# Patient Record
Sex: MALE | Race: WHITE | Employment: OTHER | URBAN - NONMETROPOLITAN AREA
[De-identification: names, ages, dates, MRNs, and addresses within clinical notes are randomized per-mention and may not be internally consistent; named-entity substitution may affect disease eponyms.]

---

## 2024-11-18 ENCOUNTER — LAB (OUTPATIENT)
Dept: LAB | Age: 44
End: 2024-11-18

## 2024-11-18 ENCOUNTER — OFFICE VISIT (OUTPATIENT)
Dept: RHEUMATOLOGY | Age: 44
End: 2024-11-18
Payer: COMMERCIAL

## 2024-11-18 VITALS
HEIGHT: 69 IN | DIASTOLIC BLOOD PRESSURE: 90 MMHG | WEIGHT: 158 LBS | HEART RATE: 109 BPM | BODY MASS INDEX: 23.4 KG/M2 | SYSTOLIC BLOOD PRESSURE: 138 MMHG | OXYGEN SATURATION: 96 %

## 2024-11-18 DIAGNOSIS — M05.9 SEROPOSITIVE RHEUMATOID ARTHRITIS (HCC): Primary | ICD-10-CM

## 2024-11-18 DIAGNOSIS — M05.9 SEROPOSITIVE RHEUMATOID ARTHRITIS (HCC): ICD-10-CM

## 2024-11-18 DIAGNOSIS — Z79.899 ENCOUNTER FOR LONG-TERM (CURRENT) USE OF HIGH-RISK MEDICATION: ICD-10-CM

## 2024-11-18 LAB
ALBUMIN SERPL BCG-MCNC: 3.8 G/DL (ref 3.5–5.1)
ALP SERPL-CCNC: 101 U/L (ref 38–126)
ALT SERPL W/O P-5'-P-CCNC: 16 U/L (ref 11–66)
ANION GAP SERPL CALC-SCNC: 13 MEQ/L (ref 8–16)
AST SERPL-CCNC: 22 U/L (ref 5–40)
BILIRUB SERPL-MCNC: 0.3 MG/DL (ref 0.3–1.2)
BUN SERPL-MCNC: 8 MG/DL (ref 7–22)
CALCIUM SERPL-MCNC: 9.2 MG/DL (ref 8.5–10.5)
CHLORIDE SERPL-SCNC: 98 MEQ/L (ref 98–111)
CO2 SERPL-SCNC: 26 MEQ/L (ref 23–33)
CREAT SERPL-MCNC: 0.6 MG/DL (ref 0.4–1.2)
GFR SERPL CREATININE-BSD FRML MDRD: > 90 ML/MIN/1.73M2
GLUCOSE SERPL-MCNC: 100 MG/DL (ref 70–108)
POTASSIUM SERPL-SCNC: 4.2 MEQ/L (ref 3.5–5.2)
PROT SERPL-MCNC: 8.3 G/DL (ref 6.1–8)
SODIUM SERPL-SCNC: 137 MEQ/L (ref 135–145)
URATE SERPL-MCNC: 4.7 MG/DL (ref 3.7–7)

## 2024-11-18 PROCEDURE — G8420 CALC BMI NORM PARAMETERS: HCPCS | Performed by: INTERNAL MEDICINE

## 2024-11-18 PROCEDURE — G8484 FLU IMMUNIZE NO ADMIN: HCPCS | Performed by: INTERNAL MEDICINE

## 2024-11-18 PROCEDURE — G8427 DOCREV CUR MEDS BY ELIG CLIN: HCPCS | Performed by: INTERNAL MEDICINE

## 2024-11-18 PROCEDURE — 99204 OFFICE O/P NEW MOD 45 MIN: CPT | Performed by: INTERNAL MEDICINE

## 2024-11-18 PROCEDURE — 1036F TOBACCO NON-USER: CPT | Performed by: INTERNAL MEDICINE

## 2024-11-18 RX ORDER — BUPRENORPHINE AND NALOXONE 8; 2 MG/1; MG/1
FILM, SOLUBLE BUCCAL; SUBLINGUAL
COMMUNITY
Start: 2024-10-24

## 2024-11-18 RX ORDER — ALBUTEROL SULFATE 90 UG/1
2 INHALANT RESPIRATORY (INHALATION) EVERY 4 HOURS PRN
COMMUNITY
Start: 2024-10-25

## 2024-11-18 RX ORDER — PREDNISONE 10 MG/1
TABLET ORAL
Qty: 30 TABLET | Refills: 0 | Status: SHIPPED | OUTPATIENT
Start: 2024-11-18

## 2024-11-18 ASSESSMENT — ENCOUNTER SYMPTOMS
COUGH: 0
NAUSEA: 0
SHORTNESS OF BREATH: 0
ABDOMINAL PAIN: 0
VOMITING: 0
BLOOD IN STOOL: 0

## 2024-11-18 ASSESSMENT — JOINT PAIN
TOTAL NUMBER OF TENDER JOINTS: 3
TOTAL NUMBER OF SWOLLEN JOINTS: 12

## 2024-11-18 NOTE — PROGRESS NOTES
[facility]   Rheumatology Clinic Note      11/18/2024       Reason for Consult:  joint pain  Requesting Physician:  Rhonda Arellano APRN*     CHIEF COMPLAINT:    Chief Complaint   Patient presents with    New Patient     NP Joint pain + Rheumatoid Factor Elevated Sed Rate and CRP  He is having pain all over. His pain is a 4-5.  He is having hand and ankle swelling.            HISTORY OF PRESENT ILLNESS:    44 y.o. male presents today for evaluation of joint pain, positive RF/CCP antibodies.  Reports having episodes of pain and swelling in his joints for past 5 years. However, over this year, he developed worsening pain and swelling that has been more persistent and progressing.     Has pain and swelling in multiple joints that is moving from one area to the other. In hands, ankles, knees, elbows, shoulders. Has prolonged morning stiffness that lasts for 1-2 hours. Movement and activities aggravate the pain. Worsens with prolonged sitting.   Ibuprofen provides partial relief.    H/o back surgeries in the past. H/o being on pain medications after his back surgeries, then was started on Suboxone to stop the narcotic treatments.      Past Medical History:     has a past medical history of Asthma and SVT (supraventricular tachycardia) ([facility]).    Past Surgical History:     has a past surgical history that includes Tonsillectomy; back surgery (01/01/2011); and lumbar laminectomy.    Current Medications:      Current Outpatient Medications:     albuterol sulfate HFA (PROVENTIL;VENTOLIN;PROAIR) 108 (90 Base) MCG/ACT inhaler, Inhale 2 puffs into the lungs every 4 hours as needed, Disp: , Rfl:     buprenorphine-naloxone (SUBOXONE) 8-2 MG FILM SL film, DISSOLVE 1 FILM UNDER THE TONGUE 4 TIMES A DAY, Disp: , Rfl:     sertraline (ZOLOFT) 50 MG tablet, Take 1 tablet by mouth daily, Disp: , Rfl:     predniSONE (DELTASONE) 10 MG tablet, Take one tab daily x 3 days as needed for RA flare up, Disp: 30 tablet, Rfl:

## 2024-11-19 ENCOUNTER — TELEPHONE (OUTPATIENT)
Dept: RHEUMATOLOGY | Age: 44
End: 2024-11-19

## 2024-11-19 DIAGNOSIS — M05.9 SEROPOSITIVE RHEUMATOID ARTHRITIS (HCC): Primary | ICD-10-CM

## 2024-11-19 RX ORDER — FOLIC ACID 1 MG/1
1 TABLET ORAL DAILY
Qty: 30 TABLET | Refills: 1 | Status: SHIPPED | OUTPATIENT
Start: 2024-11-19 | End: 2025-01-18

## 2024-11-19 RX ORDER — METHOTREXATE 2.5 MG/1
15 TABLET ORAL WEEKLY
Qty: 72 TABLET | Refills: 0 | Status: SHIPPED | OUTPATIENT
Start: 2024-11-19 | End: 2025-02-17

## 2024-11-19 NOTE — TELEPHONE ENCOUNTER
Please inform patient we received his lab results and his liver function is normal. A prescription for methotrexate 15 mg (6 tabs) weekly was sent to the pharmacy on file.

## 2024-12-11 DIAGNOSIS — M05.9 SEROPOSITIVE RHEUMATOID ARTHRITIS (HCC): ICD-10-CM

## 2024-12-11 RX ORDER — FOLIC ACID 1 MG/1
1000 TABLET ORAL DAILY
Qty: 90 TABLET | Refills: 1 | Status: SHIPPED | OUTPATIENT
Start: 2024-12-11

## 2024-12-17 RX ORDER — PREDNISONE 10 MG/1
TABLET ORAL
Qty: 30 TABLET | Refills: 0 | Status: SHIPPED | OUTPATIENT
Start: 2024-12-17

## 2025-01-06 DIAGNOSIS — M05.9 SEROPOSITIVE RHEUMATOID ARTHRITIS (HCC): ICD-10-CM

## 2025-01-06 RX ORDER — FOLIC ACID 1 MG/1
1000 TABLET ORAL DAILY
Qty: 90 TABLET | Refills: 1 | Status: CANCELLED | OUTPATIENT
Start: 2025-01-06

## 2025-01-06 RX ORDER — PREDNISONE 10 MG/1
TABLET ORAL
Qty: 30 TABLET | Refills: 0 | Status: SHIPPED | OUTPATIENT
Start: 2025-01-06

## 2025-01-20 ENCOUNTER — LAB (OUTPATIENT)
Dept: LAB | Age: 45
End: 2025-01-20

## 2025-01-20 ENCOUNTER — OFFICE VISIT (OUTPATIENT)
Age: 45
End: 2025-01-20
Payer: COMMERCIAL

## 2025-01-20 VITALS
DIASTOLIC BLOOD PRESSURE: 84 MMHG | SYSTOLIC BLOOD PRESSURE: 118 MMHG | HEIGHT: 69 IN | HEART RATE: 101 BPM | OXYGEN SATURATION: 96 % | WEIGHT: 164.8 LBS | BODY MASS INDEX: 24.41 KG/M2

## 2025-01-20 DIAGNOSIS — Z79.899 ENCOUNTER FOR LONG-TERM (CURRENT) USE OF HIGH-RISK MEDICATION: ICD-10-CM

## 2025-01-20 DIAGNOSIS — M05.9 SEROPOSITIVE RHEUMATOID ARTHRITIS (HCC): ICD-10-CM

## 2025-01-20 DIAGNOSIS — M05.9 SEROPOSITIVE RHEUMATOID ARTHRITIS (HCC): Primary | ICD-10-CM

## 2025-01-20 LAB
ALBUMIN SERPL BCG-MCNC: 3.9 G/DL (ref 3.5–5.1)
ALP SERPL-CCNC: 96 U/L (ref 38–126)
ALT SERPL W/O P-5'-P-CCNC: 24 U/L (ref 11–66)
ANION GAP SERPL CALC-SCNC: 14 MEQ/L (ref 8–16)
AST SERPL-CCNC: 32 U/L (ref 5–40)
BASOPHILS ABSOLUTE: 0.1 THOU/MM3 (ref 0–0.1)
BASOPHILS NFR BLD AUTO: 0.9 %
BILIRUB SERPL-MCNC: 0.7 MG/DL (ref 0.3–1.2)
BUN SERPL-MCNC: 13 MG/DL (ref 7–22)
CALCIUM SERPL-MCNC: 9 MG/DL (ref 8.5–10.5)
CHLORIDE SERPL-SCNC: 98 MEQ/L (ref 98–111)
CO2 SERPL-SCNC: 25 MEQ/L (ref 23–33)
CREAT SERPL-MCNC: 0.7 MG/DL (ref 0.4–1.2)
CRP SERPL-MCNC: 3.36 MG/DL (ref 0–1)
DEPRECATED RDW RBC AUTO: 50.4 FL (ref 35–45)
EOSINOPHIL NFR BLD AUTO: 6.6 %
EOSINOPHILS ABSOLUTE: 0.4 THOU/MM3 (ref 0–0.4)
ERYTHROCYTE [DISTWIDTH] IN BLOOD BY AUTOMATED COUNT: 16.4 % (ref 11.5–14.5)
GFR SERPL CREATININE-BSD FRML MDRD: > 90 ML/MIN/1.73M2
GLUCOSE SERPL-MCNC: 97 MG/DL (ref 70–108)
HCT VFR BLD AUTO: 46.2 % (ref 42–52)
HGB BLD-MCNC: 14.9 GM/DL (ref 14–18)
IMM GRANULOCYTES # BLD AUTO: 0.01 THOU/MM3 (ref 0–0.07)
IMM GRANULOCYTES NFR BLD AUTO: 0.2 %
LYMPHOCYTES ABSOLUTE: 1.3 THOU/MM3 (ref 1–4.8)
LYMPHOCYTES NFR BLD AUTO: 22.4 %
MCH RBC QN AUTO: 27.9 PG (ref 26–33)
MCHC RBC AUTO-ENTMCNC: 32.3 GM/DL (ref 32.2–35.5)
MCV RBC AUTO: 86.5 FL (ref 80–94)
MONOCYTES ABSOLUTE: 0.5 THOU/MM3 (ref 0.4–1.3)
MONOCYTES NFR BLD AUTO: 8.1 %
NEUTROPHILS ABSOLUTE: 3.6 THOU/MM3 (ref 1.8–7.7)
NEUTROPHILS NFR BLD AUTO: 61.8 %
NRBC BLD AUTO-RTO: 0 /100 WBC
PLATELET # BLD AUTO: 284 THOU/MM3 (ref 130–400)
PMV BLD AUTO: 9.9 FL (ref 9.4–12.4)
POTASSIUM SERPL-SCNC: 3.9 MEQ/L (ref 3.5–5.2)
PROT SERPL-MCNC: 7.8 G/DL (ref 6.1–8)
RBC # BLD AUTO: 5.34 MILL/MM3 (ref 4.7–6.1)
SODIUM SERPL-SCNC: 137 MEQ/L (ref 135–145)
WBC # BLD AUTO: 5.8 THOU/MM3 (ref 4.8–10.8)

## 2025-01-20 PROCEDURE — G8420 CALC BMI NORM PARAMETERS: HCPCS | Performed by: INTERNAL MEDICINE

## 2025-01-20 PROCEDURE — 1036F TOBACCO NON-USER: CPT | Performed by: INTERNAL MEDICINE

## 2025-01-20 PROCEDURE — G8427 DOCREV CUR MEDS BY ELIG CLIN: HCPCS | Performed by: INTERNAL MEDICINE

## 2025-01-20 PROCEDURE — 99213 OFFICE O/P EST LOW 20 MIN: CPT | Performed by: INTERNAL MEDICINE

## 2025-01-20 PROCEDURE — 99214 OFFICE O/P EST MOD 30 MIN: CPT | Performed by: INTERNAL MEDICINE

## 2025-01-20 RX ORDER — METHOTREXATE 2.5 MG/1
20 TABLET ORAL WEEKLY
Qty: 96 TABLET | Refills: 0 | Status: SHIPPED | OUTPATIENT
Start: 2025-01-20 | End: 2025-04-20

## 2025-01-20 ASSESSMENT — ENCOUNTER SYMPTOMS
ABDOMINAL PAIN: 0
COUGH: 0
VOMITING: 0
SHORTNESS OF BREATH: 0
NAUSEA: 0

## 2025-01-20 ASSESSMENT — JOINT PAIN
TOTAL NUMBER OF SWOLLEN JOINTS: 1
TOTAL NUMBER OF TENDER JOINTS: 0

## 2025-01-20 NOTE — PROGRESS NOTES
[facility]   Rheumatology Clinic Note      1/20/2025         CHIEF COMPLAINT:    Chief Complaint   Patient presents with    Follow-up     6 week follow up Seropositive rheumatoid arthritis (HCC)    Joint Pain     Patient reports right elbow pain  Pain level 2.       OTHER     Patient states that he has improved since his last visit.            HISTORY OF PRESENT ILLNESS:    44 y.o. male with seropositive rheumatoid arthritis (+RF, +CCP) presents for follow up. When seen last, he was started on methotrexate 15 mg PO once weekly and folic acid 1 mg daily. Prednsione 10 mg prn was prescribed.    Reports significant improvement in his joint pain and swelling with current treatment plan. Stiffness improved as well. Reports taking prednisone most days of the week, as he experiences more pain and swelling on days he does not take it.     H/o back surgeries in the past. H/o being on pain medications after his back surgeries, then was started on Suboxone to stop the narcotic treatments.      Past Medical History:     has a past medical history of Asthma and SVT (supraventricular tachycardia) ([facility]).    Past Surgical History:     has a past surgical history that includes Tonsillectomy; back surgery (01/01/2011); and lumbar laminectomy.    Current Medications:      Current Outpatient Medications:     methotrexate (RHEUMATREX) 2.5 MG chemo tablet, Take 8 tablets by mouth once a week, Disp: 96 tablet, Rfl: 0    predniSONE (DELTASONE) 10 MG tablet, TAKE ONE TABLET DAILY FOR 3 DAYS AS NEEDED FOR RA FLARE UP, Disp: 30 tablet, Rfl: 0    folic acid (FOLVITE) 1 MG tablet, TAKE 1 TABLET BY MOUTH EVERY DAY, Disp: 90 tablet, Rfl: 1    albuterol sulfate HFA (PROVENTIL;VENTOLIN;PROAIR) 108 (90 Base) MCG/ACT inhaler, Inhale 2 puffs into the lungs every 4 hours as needed, Disp: , Rfl:     buprenorphine-naloxone (SUBOXONE) 8-2 MG FILM SL film, DISSOLVE 1 FILM UNDER THE TONGUE 4 TIMES A DAY, Disp: , Rfl:     sertraline (ZOLOFT) 50

## 2025-01-31 DIAGNOSIS — M05.9 SEROPOSITIVE RHEUMATOID ARTHRITIS (HCC): ICD-10-CM

## 2025-02-03 RX ORDER — FOLIC ACID 1 MG/1
1000 TABLET ORAL DAILY
Qty: 90 TABLET | Refills: 1 | Status: SHIPPED | OUTPATIENT
Start: 2025-02-03

## 2025-02-03 RX ORDER — METHOTREXATE 2.5 MG/1
20 TABLET ORAL WEEKLY
Qty: 96 TABLET | Refills: 0 | Status: SHIPPED | OUTPATIENT
Start: 2025-02-03 | End: 2025-05-04

## 2025-02-03 RX ORDER — PREDNISONE 10 MG/1
TABLET ORAL
Qty: 30 TABLET | Refills: 0 | Status: SHIPPED | OUTPATIENT
Start: 2025-02-03

## 2025-02-03 NOTE — TELEPHONE ENCOUNTER
Please call pharmacy and authorize the higher methotrexate dose prescription to be filled.    I looked up GoodRx, and on average it would cost around $35 at [facility] for 96 tablets (90-day supply) with GoodRx coupon. If insurance will not cover, pt may use the coupon.

## 2025-02-05 NOTE — TELEPHONE ENCOUNTER
Spoke with the pharmacist, I have been advised that the script was picked up by the patient yesterday.

## 2025-02-08 DIAGNOSIS — M05.9 SEROPOSITIVE RHEUMATOID ARTHRITIS (HCC): ICD-10-CM

## 2025-02-10 RX ORDER — METHOTREXATE 2.5 MG/1
TABLET ORAL
Qty: 72 TABLET | OUTPATIENT
Start: 2025-02-10

## 2025-03-05 LAB
ALBUMIN: 4.3 G/DL
ALP BLD-CCNC: 72 U/L
ALT SERPL-CCNC: 20 U/L
ANION GAP SERPL CALCULATED.3IONS-SCNC: NORMAL MMOL/L
AST SERPL-CCNC: 23 U/L
BASOPHILS ABSOLUTE: 69 /ΜL
BASOPHILS RELATIVE PERCENT: 1 %
BILIRUB SERPL-MCNC: 0.5 MG/DL (ref 0.1–1.4)
BUN BLDV-MCNC: 10 MG/DL
C-REACTIVE PROTEIN: 5.4
CALCIUM SERPL-MCNC: 9.2 MG/DL
CHLORIDE BLD-SCNC: 103 MMOL/L
CO2: 29 MMOL/L
CREAT SERPL-MCNC: 0.7 MG/DL
EOSINOPHILS ABSOLUTE: 90 /ΜL
EOSINOPHILS RELATIVE PERCENT: 1.3 %
GFR, ESTIMATED: 116
GLUCOSE BLD-MCNC: 104 MG/DL
HCT VFR BLD CALC: 46.9 % (ref 41–53)
HEMOGLOBIN: 15.8 G/DL (ref 13.5–17.5)
LYMPHOCYTES ABSOLUTE: 1166 /ΜL
LYMPHOCYTES RELATIVE PERCENT: 16.9 %
MCH RBC QN AUTO: 29.3 PG
MCHC RBC AUTO-ENTMCNC: 33.7 G/DL
MCV RBC AUTO: 87 FL
MONOCYTES ABSOLUTE: 400 /ΜL
MONOCYTES RELATIVE PERCENT: 5.8 %
NEUTROPHILS ABSOLUTE: 5175 /ΜL
NEUTROPHILS RELATIVE PERCENT: 75 %
PDW BLD-RTO: 15 %
PLATELET # BLD: 291 K/ΜL
PMV BLD AUTO: 11.1 FL
POTASSIUM SERPL-SCNC: 4.1 MMOL/L
RBC # BLD: 5.39 10^6/ΜL
SED RATE, AUTOMATED: 9
SODIUM BLD-SCNC: 140 MMOL/L
TOTAL PROTEIN: 7.5 G/DL (ref 6.4–8.2)
WBC # BLD: 6.9 10^3/ML

## 2025-03-12 ENCOUNTER — OFFICE VISIT (OUTPATIENT)
Age: 45
End: 2025-03-12
Payer: COMMERCIAL

## 2025-03-12 VITALS
WEIGHT: 161.7 LBS | BODY MASS INDEX: 23.95 KG/M2 | DIASTOLIC BLOOD PRESSURE: 70 MMHG | HEIGHT: 69 IN | SYSTOLIC BLOOD PRESSURE: 112 MMHG | HEART RATE: 86 BPM | OXYGEN SATURATION: 98 %

## 2025-03-12 DIAGNOSIS — Z11.1 SCREENING-PULMONARY TB: ICD-10-CM

## 2025-03-12 DIAGNOSIS — Z79.899 ENCOUNTER FOR LONG-TERM (CURRENT) USE OF HIGH-RISK MEDICATION: ICD-10-CM

## 2025-03-12 DIAGNOSIS — M05.9 SEROPOSITIVE RHEUMATOID ARTHRITIS (HCC): Primary | ICD-10-CM

## 2025-03-12 DIAGNOSIS — Z11.59 NEED FOR HEPATITIS B SCREENING TEST: ICD-10-CM

## 2025-03-12 PROCEDURE — G8420 CALC BMI NORM PARAMETERS: HCPCS | Performed by: INTERNAL MEDICINE

## 2025-03-12 PROCEDURE — 1036F TOBACCO NON-USER: CPT | Performed by: INTERNAL MEDICINE

## 2025-03-12 PROCEDURE — 99213 OFFICE O/P EST LOW 20 MIN: CPT | Performed by: INTERNAL MEDICINE

## 2025-03-12 PROCEDURE — G8428 CUR MEDS NOT DOCUMENT: HCPCS | Performed by: INTERNAL MEDICINE

## 2025-03-12 PROCEDURE — 99214 OFFICE O/P EST MOD 30 MIN: CPT | Performed by: INTERNAL MEDICINE

## 2025-03-12 ASSESSMENT — ENCOUNTER SYMPTOMS
ABDOMINAL PAIN: 0
NAUSEA: 0
SHORTNESS OF BREATH: 0
COUGH: 0
VOMITING: 0

## 2025-03-12 NOTE — PROGRESS NOTES
Mood and Affect: Mood normal.            There is currently no information documented on the homunculus. Go to the Rheumatology activity and complete the homunculus joint exam.    Swollen: --  Tender: --  YOUSIF-28 (ESR): --  YOUSIF-28 (CRP): --      DATA:     Latest Reference Range & Units 11/18/24 13:42   Sodium 135 - 145 meq/L 137   Potassium 3.5 - 5.2 meq/L 4.2   Chloride 98 - 111 meq/L 98   CARBON DIOXIDE 23 - 33 meq/L 26   BUN,BUNPL 7 - 22 mg/dL 8   Creatinine 0.4 - 1.2 mg/dL 0.6   Anion Gap 8.0 - 16.0 meq/L 13.0   Est, Glom Filt Rate >60 ml/min/1.73m2 > 90   Glucose 70 - 108 mg/dL 100   Calcium 8.5 - 10.5 mg/dL 9.2   Total Protein 6.1 - 8.0 g/dL 8.3 (H)   Uric Acid 3.7 - 7.0 mg/dL 4.7   Albumin 3.5 - 5.1 g/dL 3.8   Alkaline Phosphatase 38 - 126 U/L 101   ALT 11 - 66 U/L 16   AST 5 - 40 U/L 22   Total Bilirubin 0.3 - 1.2 mg/dL 0.3               RAPID3 Composite Score = MDHAQ (0-10) + Patient pain VAS (0-10): + Patient global assessment VAS (0-10):     11/18/24    RAPID 3: 6.7 + 8.5 + 7 = 22.2    1/20/25  --- RAPID 3: 3.3 + 4 + 9 = 16.4    3/12/25  --- RAPID 3: 0.7 + 1.5 + 0 = 2.2 (MTX 20mg/wk)     Remission: <3, Low Disease: <6 Moderate Disease: >=6 & <=12, High Disease: >12        IMPRESSION/RECOMMENDATIONS:      1. Seropositive rheumatoid arthritis (+RF, +CCP): improving, however, has been taking prednsione most days of the week.   -- discussed with pt treatment options. He agreed to start Humira 40 mg SQ every 2 weeks. Discussed with pt the benefits, risks and adverse effects of this medication. Patient expressed understanding.  -- continue methotrexate 20 mg PO once weekly and folic acid 1 mg daily  -- prednisone 10 mg daily x 3 days prn flare ups  -- assess QFT TB and Hep B serologies    2. High risk med requiring close monitoring:  -- MTX: monitor CBC and CMP every 3 months    RTC in 3 months      Orders Placed This Encounter   Procedures    Quantiferon TB Gold     Standing Status:   Future     Expected

## 2025-03-17 LAB
ALBUMIN: 4.2 G/DL
ALP BLD-CCNC: 64 U/L
ALT SERPL-CCNC: 17 U/L
ANION GAP SERPL CALCULATED.3IONS-SCNC: NORMAL MMOL/L
AST SERPL-CCNC: 23 U/L
BASOPHILS ABSOLUTE: 86 /ΜL
BASOPHILS RELATIVE PERCENT: 1.1 %
BILIRUB SERPL-MCNC: 0.3 MG/DL (ref 0.1–1.4)
BUN BLDV-MCNC: 12 MG/DL
C-REACTIVE PROTEIN: 27.4
CALCIUM SERPL-MCNC: 9.1 MG/DL
CHLORIDE BLD-SCNC: 103 MMOL/L
CO2: 28 MMOL/L
CREAT SERPL-MCNC: 0.73 MG/DL
EOSINOPHILS ABSOLUTE: 335 /ΜL
EOSINOPHILS RELATIVE PERCENT: 4.3 %
GFR, ESTIMATED: 114
GLUCOSE BLD-MCNC: 71 MG/DL
HCT VFR BLD CALC: 42.9 % (ref 41–53)
HEMOGLOBIN: 14.5 G/DL (ref 13.5–17.5)
LYMPHOCYTES ABSOLUTE: 2574 /ΜL
LYMPHOCYTES RELATIVE PERCENT: 33 %
MCH RBC QN AUTO: 29.4 PG
MCHC RBC AUTO-ENTMCNC: 33.8 G/DL
MCV RBC AUTO: 86.8 FL
MONOCYTES ABSOLUTE: 694 /ΜL
MONOCYTES RELATIVE PERCENT: 8.9 %
NEUTROPHILS ABSOLUTE: 4111 /ΜL
NEUTROPHILS RELATIVE PERCENT: 52.7 %
PDW BLD-RTO: 14.5 %
PLATELET # BLD: 296 K/ΜL
PMV BLD AUTO: 11.5 FL
POTASSIUM SERPL-SCNC: 3.8 MMOL/L
QUANTI TB GOLD PLUS: NEGATIVE
QUANTI TB1 MINUS NIL: 0
QUANTI TB2 MINUS NIL: 0
QUANTIFERON MITOGEN: 8.23
QUANTIFERON NIL: 0.05
RBC # BLD: 4.94 10^6/ΜL
SED RATE, AUTOMATED: 17
SODIUM BLD-SCNC: 142 MMOL/L
TOTAL PROTEIN: 7.2 G/DL (ref 6.4–8.2)
WBC # BLD: 7.8 10^3/ML

## 2025-03-20 ENCOUNTER — RESULTS FOLLOW-UP (OUTPATIENT)
Age: 45
End: 2025-03-20

## 2025-03-20 DIAGNOSIS — M05.9 SEROPOSITIVE RHEUMATOID ARTHRITIS (HCC): Primary | ICD-10-CM

## 2025-04-28 RX ORDER — PREDNISONE 10 MG/1
TABLET ORAL
Qty: 30 TABLET | Refills: 0 | OUTPATIENT
Start: 2025-04-28

## 2025-04-28 NOTE — TELEPHONE ENCOUNTER
Please confirm with pt if he needs a refill on prednsione. Prescription was written in March with instructions to take prednisone only for flare ups.    And has he started Humira?

## 2025-04-28 NOTE — TELEPHONE ENCOUNTER
Contacted patient regarding Prednisone.  He states that he did not request the refill.  He also reports that he has had 2 doses of Humira.

## 2025-05-15 RX ORDER — PREDNISONE 10 MG/1
TABLET ORAL
Qty: 30 TABLET | Refills: 0 | Status: SHIPPED | OUTPATIENT
Start: 2025-05-15

## 2025-06-17 NOTE — TELEPHONE ENCOUNTER
Prescription request for prednsione.   I see that this was last prescribed on 5/15/2025 for 30 tablets to be taken as needed for RA flare up. Please confirm with patient if he needs a refill on prednisone? How often has he been taking it?

## 2025-06-18 RX ORDER — PREDNISONE 10 MG/1
TABLET ORAL
Qty: 30 TABLET | Refills: 0 | OUTPATIENT
Start: 2025-06-18

## 2025-07-03 DIAGNOSIS — M05.9 SEROPOSITIVE RHEUMATOID ARTHRITIS (HCC): ICD-10-CM

## 2025-07-20 DIAGNOSIS — M05.9 SEROPOSITIVE RHEUMATOID ARTHRITIS (HCC): ICD-10-CM

## 2025-07-21 RX ORDER — METHOTREXATE 2.5 MG/1
TABLET ORAL
Qty: 96 TABLET | Refills: 0 | OUTPATIENT
Start: 2025-07-21

## 2025-07-21 RX ORDER — PREDNISONE 10 MG/1
TABLET ORAL
Qty: 30 TABLET | Refills: 0 | Status: SHIPPED | OUTPATIENT
Start: 2025-07-21

## 2025-07-25 LAB
ALBUMIN: 4.2 G/DL
ALP BLD-CCNC: 66 U/L
ALT SERPL-CCNC: 21 U/L
ANION GAP SERPL CALCULATED.3IONS-SCNC: NORMAL MMOL/L
AST SERPL-CCNC: 24 U/L
BILIRUB SERPL-MCNC: 0.5 MG/DL (ref 0.1–1.4)
BUN BLDV-MCNC: 14 MG/DL
CALCIUM SERPL-MCNC: 9 MG/DL
CHLORIDE BLD-SCNC: 105 MMOL/L
CO2: 30 MMOL/L
CREAT SERPL-MCNC: 0.74 MG/DL
GFR, ESTIMATED: 114
GLUCOSE BLD-MCNC: 123 MG/DL
POTASSIUM SERPL-SCNC: 4.5 MMOL/L
SODIUM BLD-SCNC: 140 MMOL/L
TOTAL PROTEIN: 7.1 G/DL (ref 6.4–8.2)

## 2025-08-11 ENCOUNTER — LAB (OUTPATIENT)
Dept: LAB | Age: 45
End: 2025-08-11

## 2025-08-11 ENCOUNTER — OFFICE VISIT (OUTPATIENT)
Age: 45
End: 2025-08-11
Payer: COMMERCIAL

## 2025-08-11 VITALS
SYSTOLIC BLOOD PRESSURE: 102 MMHG | OXYGEN SATURATION: 96 % | DIASTOLIC BLOOD PRESSURE: 68 MMHG | HEART RATE: 85 BPM | WEIGHT: 164.5 LBS | BODY MASS INDEX: 24.37 KG/M2 | HEIGHT: 69 IN

## 2025-08-11 DIAGNOSIS — Z79.899 ENCOUNTER FOR LONG-TERM (CURRENT) USE OF HIGH-RISK MEDICATION: ICD-10-CM

## 2025-08-11 DIAGNOSIS — M05.9 SEROPOSITIVE RHEUMATOID ARTHRITIS (HCC): ICD-10-CM

## 2025-08-11 DIAGNOSIS — M05.9 SEROPOSITIVE RHEUMATOID ARTHRITIS (HCC): Primary | ICD-10-CM

## 2025-08-11 LAB
BASOPHILS ABSOLUTE: 0.1 THOU/MM3 (ref 0–0.1)
BASOPHILS NFR BLD AUTO: 0.6 %
DEPRECATED RDW RBC AUTO: 45.1 FL (ref 35–45)
EOSINOPHIL NFR BLD AUTO: 4.5 %
EOSINOPHILS ABSOLUTE: 0.6 THOU/MM3 (ref 0–0.4)
ERYTHROCYTE [DISTWIDTH] IN BLOOD BY AUTOMATED COUNT: 13.5 % (ref 11.5–14.5)
HCT VFR BLD AUTO: 42.7 % (ref 42–52)
HGB BLD-MCNC: 14 GM/DL (ref 14–18)
IMM GRANULOCYTES # BLD AUTO: 0.04 THOU/MM3 (ref 0–0.07)
IMM GRANULOCYTES NFR BLD AUTO: 0.3 %
LYMPHOCYTES ABSOLUTE: 5 THOU/MM3 (ref 1–4.8)
LYMPHOCYTES NFR BLD AUTO: 34.9 %
MCH RBC QN AUTO: 30 PG (ref 26–33)
MCHC RBC AUTO-ENTMCNC: 32.8 GM/DL (ref 32.2–35.5)
MCV RBC AUTO: 91.6 FL (ref 80–94)
MONOCYTES ABSOLUTE: 1.3 THOU/MM3 (ref 0.4–1.3)
MONOCYTES NFR BLD AUTO: 8.9 %
NEUTROPHILS ABSOLUTE: 7.2 THOU/MM3 (ref 1.8–7.7)
NEUTROPHILS NFR BLD AUTO: 50.8 %
NRBC BLD AUTO-RTO: 0 /100 WBC
PLATELET # BLD AUTO: 266 THOU/MM3 (ref 130–400)
PMV BLD AUTO: 10.3 FL (ref 9.4–12.4)
RBC # BLD AUTO: 4.66 MILL/MM3 (ref 4.7–6.1)
WBC # BLD AUTO: 14.2 THOU/MM3 (ref 4.8–10.8)

## 2025-08-11 PROCEDURE — G8427 DOCREV CUR MEDS BY ELIG CLIN: HCPCS | Performed by: INTERNAL MEDICINE

## 2025-08-11 PROCEDURE — 99213 OFFICE O/P EST LOW 20 MIN: CPT | Performed by: INTERNAL MEDICINE

## 2025-08-11 PROCEDURE — G8420 CALC BMI NORM PARAMETERS: HCPCS | Performed by: INTERNAL MEDICINE

## 2025-08-11 PROCEDURE — 1036F TOBACCO NON-USER: CPT | Performed by: INTERNAL MEDICINE

## 2025-08-11 PROCEDURE — 99214 OFFICE O/P EST MOD 30 MIN: CPT | Performed by: INTERNAL MEDICINE

## 2025-08-11 RX ORDER — FOLIC ACID 1 MG/1
1000 TABLET ORAL DAILY
Qty: 90 TABLET | Refills: 1 | Status: SHIPPED | OUTPATIENT
Start: 2025-08-11

## 2025-08-11 RX ORDER — METHOTREXATE 2.5 MG/1
20 TABLET ORAL WEEKLY
Qty: 96 TABLET | Refills: 0 | Status: SHIPPED | OUTPATIENT
Start: 2025-08-11 | End: 2025-11-09

## 2025-08-11 ASSESSMENT — ENCOUNTER SYMPTOMS
VOMITING: 0
NAUSEA: 0
COUGH: 0
ABDOMINAL PAIN: 0
SHORTNESS OF BREATH: 0

## 2025-08-11 ASSESSMENT — JOINT PAIN
TOTAL NUMBER OF TENDER JOINTS: 6
TOTAL NUMBER OF SWOLLEN JOINTS: 2

## 2025-08-13 ENCOUNTER — PATIENT MESSAGE (OUTPATIENT)
Age: 45
End: 2025-08-13

## 2025-09-01 ENCOUNTER — PATIENT MESSAGE (OUTPATIENT)
Age: 45
End: 2025-09-01

## 2025-09-01 DIAGNOSIS — M05.9 SEROPOSITIVE RHEUMATOID ARTHRITIS (HCC): Primary | ICD-10-CM

## 2025-09-02 RX ORDER — CELECOXIB 200 MG/1
200 CAPSULE ORAL DAILY
Qty: 30 CAPSULE | Refills: 2 | Status: SHIPPED | OUTPATIENT
Start: 2025-09-02